# Patient Record
Sex: MALE | Race: WHITE | URBAN - METROPOLITAN AREA
[De-identification: names, ages, dates, MRNs, and addresses within clinical notes are randomized per-mention and may not be internally consistent; named-entity substitution may affect disease eponyms.]

---

## 2018-10-20 ENCOUNTER — IMPORTED ENCOUNTER (OUTPATIENT)
Dept: URBAN - METROPOLITAN AREA CLINIC 50 | Facility: CLINIC | Age: 43
End: 2018-10-20

## 2019-08-14 NOTE — PATIENT DISCUSSION
The IOP remains elevated despite current medication. Possible glaucoma laser surgery is recommended. The risks, benefits, and alternatives to SLT were discussed. The patient elects to obtain a consult with Dr. Che Clark.

## 2019-08-20 NOTE — PATIENT DISCUSSION
The IOP remains elevated despite current medication. Possible glaucoma laser surgery is recommended. The risks, benefits, and alternatives to SLT were discussed. The patient elects to obtain a consult with Dr. Ezequiel Radford.

## 2019-08-20 NOTE — PATIENT DISCUSSION
Recommended excision/biopsy due to patient discomfort.  Patient desires to proceed today.  Photo taken.  Consent form signed.   Heri cheyenne BID x 2 weeks or until healed. Follow up prn.

## 2019-09-25 NOTE — PATIENT DISCUSSION
The IOP remains elevated despite current medication. Possible glaucoma laser surgery is recommended. The risks, benefits, and alternatives to SLT were discussed. The patient should obtain a consult with Dr. Ezequiel Radford.

## 2019-10-08 NOTE — PATIENT DISCUSSION
HOLD LATANOPROST OU, CONTINUE COMBIGAN OU BID. STRESSED COMPLIANCE. IF IOP IS NOT CONTROLLED NEXT VISIT, THEN ADD SLT. GAVE PT WRITTEN INSTRUCTIONS.

## 2019-12-12 NOTE — PROCEDURE NOTE: CLINICAL
PROCEDURE NOTE: SLT #1 OU. Anesthesia: Topical. Prep: Alphagan 0.15%. Prior to treatment, risks/benefits/alternatives discussed including infection, loss of vision, hemorrhage, cataract, glaucoma, retinal tears or detachment. Lens:  SLT laser lens with goniosol. Power: 1.2/1.2mJ. Total applications: 535/780. Application 939/298 degrees. Patient tolerated procedure well. There were no complications. Post-op instructions given. Post-op IOP = 21/16 mmHg. [place]

## 2021-01-08 ENCOUNTER — IMPORTED ENCOUNTER (OUTPATIENT)
Dept: URBAN - METROPOLITAN AREA CLINIC 50 | Facility: CLINIC | Age: 46
End: 2021-01-08

## 2021-01-11 ENCOUNTER — IMPORTED ENCOUNTER (OUTPATIENT)
Dept: URBAN - METROPOLITAN AREA CLINIC 50 | Facility: CLINIC | Age: 46
End: 2021-01-11

## 2021-01-19 ENCOUNTER — IMPORTED ENCOUNTER (OUTPATIENT)
Dept: URBAN - METROPOLITAN AREA CLINIC 50 | Facility: CLINIC | Age: 46
End: 2021-01-19

## 2021-01-29 ENCOUNTER — IMPORTED ENCOUNTER (OUTPATIENT)
Dept: URBAN - METROPOLITAN AREA CLINIC 50 | Facility: CLINIC | Age: 46
End: 2021-01-29

## 2021-01-29 NOTE — PATIENT DISCUSSION
"""Long discussion with patient about Toric MF vs Mono focal Toric.  Explained in some situations ""

## 2021-02-12 ENCOUNTER — IMPORTED ENCOUNTER (OUTPATIENT)
Dept: URBAN - METROPOLITAN AREA CLINIC 50 | Facility: CLINIC | Age: 46
End: 2021-02-12

## 2021-02-12 PROBLEM — H18.221: Noted: 2018-10-20

## 2021-02-12 PROBLEM — Z96.1: Noted: 2021-02-12

## 2021-02-16 ENCOUNTER — IMPORTED ENCOUNTER (OUTPATIENT)
Dept: URBAN - METROPOLITAN AREA CLINIC 50 | Facility: CLINIC | Age: 46
End: 2021-02-16

## 2021-04-09 ENCOUNTER — IMPORTED ENCOUNTER (OUTPATIENT)
Dept: URBAN - METROPOLITAN AREA CLINIC 50 | Facility: CLINIC | Age: 46
End: 2021-04-09

## 2021-04-09 NOTE — PATIENT DISCUSSION
"""""""S/P IOL OS: PanOptix Toric TFAT30 17.5 (ORA) @ 60Âº (ORA) (Target: [place]) +ORA/Femto ""

## 2021-04-17 ASSESSMENT — VISUAL ACUITY
OD_CC: 20/20
OS_CC: 20/40-
OD_BAT: 20/20
OS_BAT: 20/40
OS_OTHER: 20/40. >20/400.
OS_PH: @ 13 IN
OD_OTHER: 20/20. 20/60.
OD_OTHER: 20/20. 20/60.
OS_SC: 20/20-2
OD_PH: @ 13 IN
OS_SC: 20/50
OD_BAT: 20/20
OD_SC: 20/25
OD_SC: 20/25+2
OS_CC: J1+@ 13 IN
OS_OTHER: 20/40. 20/40.
OS_SC: 20/20-2
OD_CC: J1+@ 13 IN
OS_CC: 20/80-1
OD_SC: 20/200
OS_BAT: 20/40
OS_CC: 20/30
OS_CC: 20/40

## 2021-04-17 ASSESSMENT — TONOMETRY
OD_IOP_MMHG: 13
OS_IOP_MMHG: 14
OS_IOP_MMHG: 13
OD_IOP_MMHG: 16
OS_IOP_MMHG: 09
OD_IOP_MMHG: 15
OS_IOP_MMHG: 16
OD_IOP_MMHG: 16
OD_IOP_MMHG: 13
OS_IOP_MMHG: 14
OS_IOP_MMHG: 16

## 2021-05-11 NOTE — PATIENT DISCUSSION
Retinal referral to r/o the need for treatment. 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

## 2021-07-30 ENCOUNTER — PREPPED CHART (OUTPATIENT)
Dept: URBAN - METROPOLITAN AREA CLINIC 53 | Facility: CLINIC | Age: 46
End: 2021-07-30

## 2021-09-13 NOTE — PATIENT DISCUSSION
Discussed importance of compliance with ocular medications and follow up exams to prevent loss of vision. 1

## 2021-09-13 NOTE — PATIENT DISCUSSION
no fluid, Discussed AREDS and recommended AREDS2 vitamins; recommend daily Amsler grid use (copy of grid given); discussed no smoking or second-hand smoke.

## 2022-02-02 ENCOUNTER — ESTABLISHED PATIENT (OUTPATIENT)
Dept: URBAN - METROPOLITAN AREA CLINIC 53 | Facility: CLINIC | Age: 47
End: 2022-02-02

## 2022-02-02 DIAGNOSIS — H25.11: ICD-10-CM

## 2022-02-02 PROCEDURE — 92014 COMPRE OPH EXAM EST PT 1/>: CPT

## 2022-02-02 ASSESSMENT — VISUAL ACUITY
OS_GLARE: 20/30
OS_SC: J1+
OS_GLARE: 20/25
OD_GLARE: 20/25
OD_SC: J1+2
OD_SC: 20/25
OS_SC: 20/25+2
OD_GLARE: 20/30
OU_SC: J1+

## 2022-02-02 ASSESSMENT — TONOMETRY
OD_IOP_MMHG: 14
OS_IOP_MMHG: 13